# Patient Record
Sex: MALE | Race: BLACK OR AFRICAN AMERICAN | NOT HISPANIC OR LATINO | Employment: FULL TIME | URBAN - METROPOLITAN AREA
[De-identification: names, ages, dates, MRNs, and addresses within clinical notes are randomized per-mention and may not be internally consistent; named-entity substitution may affect disease eponyms.]

---

## 2023-04-28 ENCOUNTER — OFFICE VISIT (OUTPATIENT)
Dept: OPHTHALMOLOGY | Facility: CLINIC | Age: 58
End: 2023-04-28
Payer: COMMERCIAL

## 2023-04-28 DIAGNOSIS — H52.4 PRESBYOPIA: ICD-10-CM

## 2023-04-28 DIAGNOSIS — H11.042 PERIPHERAL PTERYGIUM, STATIONARY, LEFT EYE: Primary | ICD-10-CM

## 2023-04-28 PROCEDURE — 92004 COMPRE OPH EXAM NEW PT 1/>: CPT | Performed by: OPTOMETRIST

## 2023-04-28 PROCEDURE — 92015 DETERMINE REFRACTIVE STATE: CPT | Performed by: OPTOMETRIST

## 2023-04-28 RX ORDER — GLIPIZIDE 10 MG/1
1 TABLET ORAL DAILY PRN
COMMUNITY

## 2023-04-28 ASSESSMENT — VISUAL ACUITY
OS_SC: J3
OD_SC: 20/25
OS_SC+: -1
CORRECTION_TYPE: GLASSES
OD_SC+: +2
OD_SC: J3
OS_SC: 20/20
METHOD: SNELLEN - LINEAR

## 2023-04-28 ASSESSMENT — CUP TO DISC RATIO
OD_RATIO: 0.25
OS_RATIO: 0.25

## 2023-04-28 ASSESSMENT — SLIT LAMP EXAM - LIDS
COMMENTS: NORMAL
COMMENTS: NORMAL

## 2023-04-28 ASSESSMENT — CONF VISUAL FIELD
OS_INFERIOR_NASAL_RESTRICTION: 0
OD_NORMAL: 1
METHOD: COUNTING FINGERS
OD_INFERIOR_TEMPORAL_RESTRICTION: 0
OS_SUPERIOR_TEMPORAL_RESTRICTION: 0
OS_INFERIOR_TEMPORAL_RESTRICTION: 0
OS_NORMAL: 1
OS_SUPERIOR_NASAL_RESTRICTION: 0
OD_SUPERIOR_TEMPORAL_RESTRICTION: 0
OD_SUPERIOR_NASAL_RESTRICTION: 0
OD_INFERIOR_NASAL_RESTRICTION: 0

## 2023-04-28 ASSESSMENT — EXTERNAL EXAM - LEFT EYE: OS_EXAM: NORMAL

## 2023-04-28 ASSESSMENT — REFRACTION_MANIFEST
OD_SPHERE: -0.25
OD_ADD: +1.50
OS_ADD: +1.50
OD_AXIS: 160
OS_SPHERE: PLANO
OS_AXIS: 034
OS_CYLINDER: +0.50
OD_CYLINDER: +0.75

## 2023-04-28 ASSESSMENT — EXTERNAL EXAM - RIGHT EYE: OD_EXAM: NORMAL

## 2023-04-28 ASSESSMENT — TONOMETRY
IOP_METHOD: ICARE
OD_IOP_MMHG: 19
OS_IOP_MMHG: 17

## 2023-04-28 NOTE — PROGRESS NOTES
Assessment/Plan  (H11.042) Peripheral pterygium, stationary, left eye  (primary encounter diagnosis)  Comment: First encounter for this. Present for ~6 years per patient  Plan: Recommend regular lubrication - about 2 times daily. UV protection to hopefully limit progression was also encouraged. Follow up in 6 months to monitor for progression. Will refer to cornea specialist if this becomes more bothersome.     (H52.4) Presbyopia  Plan: REFRACTION [[id number]]        Discussed findings with patient. New spectacle prescription dispensed to patient. Patient is welcome to return to clinic with prolonged adaptation difficulties.     Complete documentation of historical and exam elements from today's encounter can  be found in the full encounter summary report (not reduplicated in this progress  note). I personally obtained the chief complaint(s) and history of present illness. I  confirmed and edited as necessary the review of systems, past medical/surgical  history, family history, social history, and examination findings as documented by  others; and I examined the patient myself. I personally reviewed the relevant tests,  images, and reports as documented above. I formulated and edited as necessary the  assessment and plan and discussed the findings and management plan with the  patient and family.    Nayan Stokes OD

## 2023-04-28 NOTE — NURSING NOTE
Chief Complaints and History of Present Illnesses   Patient presents with     COMPREHENSIVE EYE EXAM       Chief Complaint(s) and History of Present Illness(es)     COMPREHENSIVE EYE EXAM           Comments    Complains of constant blurry near vision both eyes, no issues with distance vision. Distance vision stable in both eyes.  Has growth on nasal portion of left eye x 5 yrs, appear as if growing onto colored part of left eye with intermittent itchiness, sensitivity to light - no FBS, no pain.                  Dinora Newman, COT  8:08 AM 04/28/2023